# Patient Record
Sex: MALE | Race: WHITE
[De-identification: names, ages, dates, MRNs, and addresses within clinical notes are randomized per-mention and may not be internally consistent; named-entity substitution may affect disease eponyms.]

---

## 2017-06-12 ENCOUNTER — HOSPITAL ENCOUNTER (OUTPATIENT)
Dept: HOSPITAL 45 - C.LAB1850 | Age: 27
Discharge: HOME | End: 2017-06-12
Attending: NURSE PRACTITIONER
Payer: COMMERCIAL

## 2017-06-12 DIAGNOSIS — K50.90: Primary | ICD-10-CM

## 2017-06-12 LAB
ALBUMIN/GLOB SERPL: 1.1 {RATIO} (ref 0.9–2)
ALP SERPL-CCNC: 70 U/L (ref 45–117)
ALT SERPL-CCNC: 23 U/L (ref 12–78)
ANION GAP SERPL CALC-SCNC: 6 MMOL/L (ref 3–11)
AST SERPL-CCNC: 22 U/L (ref 15–37)
BASOPHILS # BLD: 0.03 K/UL (ref 0–0.2)
BASOPHILS NFR BLD: 0.6 %
BUN SERPL-MCNC: 19 MG/DL (ref 7–18)
BUN/CREAT SERPL: 19.1 (ref 10–20)
CALCIUM SERPL-MCNC: 9.3 MG/DL (ref 8.5–10.1)
CHLORIDE SERPL-SCNC: 105 MMOL/L (ref 98–107)
CO2 SERPL-SCNC: 29 MMOL/L (ref 21–32)
COMPLETE: YES
CREAT SERPL-MCNC: 1 MG/DL (ref 0.6–1.4)
CRP SERPL-MCNC: < 0.29 MG/DL (ref 0–0.29)
EOSINOPHIL NFR BLD AUTO: 262 K/UL (ref 130–400)
GLOBULIN SER-MCNC: 3.9 GM/DL (ref 2.5–4)
GLUCOSE SERPL-MCNC: 87 MG/DL (ref 70–99)
HCT VFR BLD CALC: 41.4 % (ref 42–52)
IG%: 0.2 %
IMM GRANULOCYTES NFR BLD AUTO: 22 %
LYMPHOCYTES # BLD: 1.12 K/UL (ref 1.2–3.4)
MCH RBC QN AUTO: 28.2 PG (ref 25–34)
MCHC RBC AUTO-ENTMCNC: 33.3 G/DL (ref 32–36)
MCV RBC AUTO: 84.7 FL (ref 80–100)
MONOCYTES NFR BLD: 8.8 %
NEUTROPHILS # BLD AUTO: 4.1 %
NEUTROPHILS NFR BLD AUTO: 64.3 %
PMV BLD AUTO: 10.2 FL (ref 7.4–10.4)
POTASSIUM SERPL-SCNC: 4.2 MMOL/L (ref 3.5–5.1)
RBC # BLD AUTO: 4.89 M/UL (ref 4.7–6.1)
SODIUM SERPL-SCNC: 140 MMOL/L (ref 136–145)
WBC # BLD AUTO: 5.09 K/UL (ref 4.8–10.8)

## 2017-06-14 LAB
M TB TUBERC IGNF/MITOGEN IGNF CONTROL: 0.09 IU/ML
QUANTIF TB AG-NIL: <0 IU/ML

## 2017-07-10 ENCOUNTER — HOSPITAL ENCOUNTER (OUTPATIENT)
Dept: HOSPITAL 45 - C.MAMM | Age: 27
Discharge: HOME | End: 2017-07-10
Attending: NURSE PRACTITIONER
Payer: COMMERCIAL

## 2017-07-10 DIAGNOSIS — M85.80: Primary | ICD-10-CM

## 2017-10-12 ENCOUNTER — HOSPITAL ENCOUNTER (OUTPATIENT)
Dept: HOSPITAL 45 - C.LABBC | Age: 27
Discharge: HOME | End: 2017-10-12
Attending: INTERNAL MEDICINE
Payer: COMMERCIAL

## 2017-10-12 DIAGNOSIS — K50.90: Primary | ICD-10-CM

## 2017-10-12 LAB
ALBUMIN/GLOB SERPL: 1.1 {RATIO} (ref 0.9–2)
ALP SERPL-CCNC: 73 U/L (ref 45–117)
ALT SERPL-CCNC: 24 U/L (ref 12–78)
ANION GAP SERPL CALC-SCNC: 7 MMOL/L (ref 3–11)
AST SERPL-CCNC: 21 U/L (ref 15–37)
BASOPHILS # BLD: 0.03 K/UL (ref 0–0.2)
BASOPHILS NFR BLD: 0.6 %
BUN SERPL-MCNC: 18 MG/DL (ref 7–18)
BUN/CREAT SERPL: 18.9 (ref 10–20)
CALCIUM SERPL-MCNC: 9.2 MG/DL (ref 8.5–10.1)
CHLORIDE SERPL-SCNC: 103 MMOL/L (ref 98–107)
CO2 SERPL-SCNC: 28 MMOL/L (ref 21–32)
COMPLETE: YES
CREAT SERPL-MCNC: 0.94 MG/DL (ref 0.6–1.4)
CRP SERPL-MCNC: < 0.29 MG/DL (ref 0–0.29)
EOSINOPHIL NFR BLD AUTO: 263 K/UL (ref 130–400)
GLOBULIN SER-MCNC: 4 GM/DL (ref 2.5–4)
GLUCOSE SERPL-MCNC: 85 MG/DL (ref 70–99)
HCT VFR BLD CALC: 43.1 % (ref 42–52)
IG%: 0.2 %
IMM GRANULOCYTES NFR BLD AUTO: 20.3 %
LYMPHOCYTES # BLD: 1.02 K/UL (ref 1.2–3.4)
MCH RBC QN AUTO: 27.8 PG (ref 25–34)
MCHC RBC AUTO-ENTMCNC: 32.5 G/DL (ref 32–36)
MCV RBC AUTO: 85.7 FL (ref 80–100)
MONOCYTES NFR BLD: 8 %
NEUTROPHILS # BLD AUTO: 4 %
NEUTROPHILS NFR BLD AUTO: 66.9 %
PMV BLD AUTO: 10.7 FL (ref 7.4–10.4)
POTASSIUM SERPL-SCNC: 4 MMOL/L (ref 3.5–5.1)
RBC # BLD AUTO: 5.03 M/UL (ref 4.7–6.1)
SODIUM SERPL-SCNC: 138 MMOL/L (ref 136–145)
WBC # BLD AUTO: 5.02 K/UL (ref 4.8–10.8)

## 2018-02-15 ENCOUNTER — HOSPITAL ENCOUNTER (OUTPATIENT)
Dept: HOSPITAL 45 - C.LABBC | Age: 28
Discharge: HOME | End: 2018-02-15
Attending: INTERNAL MEDICINE
Payer: COMMERCIAL

## 2018-02-15 DIAGNOSIS — K50.90: Primary | ICD-10-CM

## 2018-02-15 LAB
ALBUMIN SERPL-MCNC: 4.2 GM/DL (ref 3.4–5)
ALP SERPL-CCNC: 60 U/L (ref 45–117)
ALT SERPL-CCNC: 26 U/L (ref 12–78)
AST SERPL-CCNC: 19 U/L (ref 15–37)
BASOPHILS # BLD: 0.04 K/UL (ref 0–0.2)
BASOPHILS NFR BLD: 1 %
BUN SERPL-MCNC: 14 MG/DL (ref 7–18)
CALCIUM SERPL-MCNC: 9.3 MG/DL (ref 8.5–10.1)
CO2 SERPL-SCNC: 29 MMOL/L (ref 21–32)
CREAT SERPL-MCNC: 0.96 MG/DL (ref 0.6–1.4)
EOS ABS #: 0.13 K/UL (ref 0–0.5)
EOSINOPHIL NFR BLD AUTO: 258 K/UL (ref 130–400)
GLUCOSE SERPL-MCNC: 90 MG/DL (ref 70–99)
HCT VFR BLD CALC: 42.5 % (ref 42–52)
HGB BLD-MCNC: 14.5 G/DL (ref 14–18)
IG#: 0 K/UL (ref 0–0.02)
IMM GRANULOCYTES NFR BLD AUTO: 26 %
LYMPHOCYTES # BLD: 1 K/UL (ref 1.2–3.4)
MCH RBC QN AUTO: 29.5 PG (ref 25–34)
MCHC RBC AUTO-ENTMCNC: 34.1 G/DL (ref 32–36)
MCV RBC AUTO: 86.4 FL (ref 80–100)
MONO ABS #: 0.33 K/UL (ref 0.11–0.59)
MONOCYTES NFR BLD: 8.6 %
NEUT ABS #: 2.35 K/UL (ref 1.4–6.5)
NEUTROPHILS # BLD AUTO: 3.4 %
NEUTROPHILS NFR BLD AUTO: 61 %
PMV BLD AUTO: 10.5 FL (ref 7.4–10.4)
POTASSIUM SERPL-SCNC: 4.2 MMOL/L (ref 3.5–5.1)
PROT SERPL-MCNC: 8.2 GM/DL (ref 6.4–8.2)
RED CELL DISTRIBUTION WIDTH CV: 13.3 % (ref 11.5–14.5)
RED CELL DISTRIBUTION WIDTH SD: 42.1 FL (ref 36.4–46.3)
SODIUM SERPL-SCNC: 138 MMOL/L (ref 136–145)
WBC # BLD AUTO: 3.85 K/UL (ref 4.8–10.8)

## 2018-05-07 ENCOUNTER — HOSPITAL ENCOUNTER (OUTPATIENT)
Dept: HOSPITAL 45 - C.GI | Age: 28
Discharge: HOME | End: 2018-05-07
Attending: INTERNAL MEDICINE
Payer: COMMERCIAL

## 2018-05-07 VITALS — HEART RATE: 75 BPM | OXYGEN SATURATION: 98 % | DIASTOLIC BLOOD PRESSURE: 75 MMHG | SYSTOLIC BLOOD PRESSURE: 119 MMHG

## 2018-05-07 VITALS
WEIGHT: 142.31 LBS | HEIGHT: 69.02 IN | WEIGHT: 142.31 LBS | HEIGHT: 69.02 IN | BODY MASS INDEX: 21.08 KG/M2 | BODY MASS INDEX: 21.08 KG/M2

## 2018-05-07 DIAGNOSIS — F41.9: ICD-10-CM

## 2018-05-07 DIAGNOSIS — K50.90: Primary | ICD-10-CM

## 2018-05-07 DIAGNOSIS — K21.9: ICD-10-CM

## 2018-05-07 NOTE — DISCHARGE INSTRUCTIONS
Endoscopy Patient Instructions


Date / Procedure(s) Performed


May 7, 2018.


Colonoscopy





Allergy Information


Coded Allergies:  


     No Known Allergies (Verified , 5/7/18)





Discharge Date / Findings


May 7, 2018.


Normal colonoscopy to ileocolonic anastomosis





Medication Instructions


Stopped Medication(s):  


no supplements since Saturday


OK to resume all medications today as prescribed





Reported Home Medications








 Medications  Dose


 Route/Sig


 Max Daily Dose Days Date Category


 


 Folic Acid 400


 Mcg Tab  1 Tab


 PO DAILY


    5/2/18 Reported


 


 Vitamin B-12


  (Cyanocobalamin)


 500 Mcg Tab  500 Mcg


 PO DAILY


    5/2/18 Reported


 


 Calcium + D3


 600-200 mg-Unit


  (Calcium


 Carbonate-Vitamin


 D) 1 Tab Tab  1 Tab


 PO DAILY


    5/2/18 Reported


 


 Hydrocortisone


  (Hydrocortisone


  (Topical)) 2.5 %


 Oin  


 TOP BID


   10 5/2/18 Reported


 


 Questran


  (Cholestyramine)


 4 Gm Pow  


 PO DAILY


    12/15/17 Reported


 


 [Entyvio]    1 Dose


 IV Q2MOS


    6/27/16 Reported











Provider Instructions





Activity Restrictions





-  No exercising or heavy lifting for 24 hours. 


-  Do not drink alcohol the day of the procedure.


-  Do not drive a car or operate machinery until the day after the procedure.


-  Do not make any important decisions or sign important papers in 24 hours 

after the procedure.





Following Day:





-  Return to full activity which may include returning to work/school.





Diet





Start your diet with liquids and light foods (jello, soup, juice, toast).  Then 

eat your usual diet if not nauseated.





Treatment For Common After Affects





For mild abdominal pain, bloating, or excessive gas:





-  Rest


-  Eat lightly


-  Lie on right side





Follow-Up Information


Follow-up with Dr. Seamus Benavides as scheduled





Anesthesia Information





What You Should Know





You have had a procedure that required some medicine to reduce anxiety and 

discomfort. This treatment is called moderate sedation.  


After receiving the treatment, you may be sleepy, but you will be able to 

breathe on your own.  The effects of the treatment may last for several hours.








Follow these instructions along with Activity/Diet recommendations noted above:





*  Do NOT do anything where dizziness or clumsiness would be dangerous.





*  Rest quietly at home today, then you can be up and about tomorrow.





*  Have a responsible person stay with you the rest of today.





*  You may have had an I.V. today.  If so, you may take the dressing off later 

today.





Recommendations


 


Call your doctor if:





*  Trouble breathing 





*  Continuous vomiting for more than 24 hours








*  Temperature above 101 degrees





*  Severe abdominal pain or bloating





*  Pain not relieved by pain medicine ordered





*  There is increased drainage or redness from any incision





*  A large amount of rectal bleeding greater than 2-3 tablespoons. 


   (If you had a polyp/s removed or have hemorrhoids, a small amount of blood -


    from the rectum is to be expected.)





*  You have any unanswered questions or concerns.








IN THE EVENT OF A SERIOUS EMERGENCY, GO TO THE NEAREST EMERGENCY ROOM








       Your discharge instructions were prepared by provider Gaurav Gandhi.





 Patient Instructions Signature Page














Sheldon Milner 











Patient (or Guardian) Signature/Date:____________________________________ I 

have read and understand the instructions given to me by my caregivers.








Caregiver/RN/Doctor Signature/Date:____________________________________











The above-named patient and/or guardian has received patient instructions on 

this date.





























+  Original Patient Signature Page (only) stays with chart.  Please make copy 

for patient.

## 2018-05-07 NOTE — ENDO HISTORY AND PHYSICAL
History & Physical


Date of Service:


May 7, 2018.


Chief Complaint:


Crohn's


Referring Physician:


Dr. Seamus Benavides


History of Present Illness


28 yo CM who presents for colonoscopy secondary to Crohn's ileitis.





Past Medical History


Gastrointestinal Disorder, Anxiety





Past Surgical History


Hx Cardiac Surgery:  No


Hx Internal Defibrillator:  No


Hx Pacemaker:  No


Hx Abdominal Surgery:  Yes (COLON RESECTION AND APPY-02/2006)


Hx of Implantable Prosthesis:  No


Hx Post-Op Nausea and Vomiting:  No


Hx Cancer Surgery:  No


Hx Thoracic Surgery:  No


Hx Orthopedic:  No


Hx Urinary Tract Surgery:  No





Family History


IBD





Social History


Smoking Status:  Never Smoker


Hx Substance Use:  No


Hx Alcohol Use:  Yes (ONCE OR TWICE A WEEK, A GLASS OF BEER OR WINE)





Allergies


Coded Allergies:  


     No Known Allergies (Verified , 5/7/18)





Current Medications





Reported Home Medications








 Medications  Dose


 Route/Sig


 Max Daily Dose Days Date Category


 


 Folic Acid 400


 Mcg Tab  1 Tab


 PO DAILY


    5/2/18 Reported


 


 Vitamin B-12


  (Cyanocobalamin)


 500 Mcg Tab  500 Mcg


 PO DAILY


    5/2/18 Reported


 


 Calcium + D3


 600-200 mg-Unit


  (Calcium


 Carbonate-Vitamin


 D) 1 Tab Tab  1 Tab


 PO DAILY


    5/2/18 Reported


 


 Hydrocortisone


  (Hydrocortisone


  (Topical)) 2.5 %


 Oin  


 TOP BID


   10 5/2/18 Reported


 


 Questran


  (Cholestyramine)


 4 Gm Pow  


 PO DAILY


    12/15/17 Reported


 


 [Entyvio]    1 Dose


 IV Q2MOS


    6/27/16 Reported











Vital Signs


Weight (Kilograms):  64.55


Height (Feet):  5


Height (Inches):  9











  Date Time  Temp Pulse Resp B/P (MAP) Pulse Ox O2 Delivery O2 Flow Rate FiO2


 


5/7/18 12:49 36.7 79 18 119/72 (88) 99 Room Air  











Physical Exam


General Appearance:  WD/WN, no apparent distress


Respiratory/Chest:  


   Auscultation:  breath sounds normal


Cardiovascular:  


   Heart Auscultation:  RRR


Abdomen:  


   Bowel Sounds:  normal


   Inspection & Palpation:  soft, non-distended, no tenderness, guarding & 

rebound





Assessment and Plan


Assessment:


28 yo CM who presents for colonoscopy secondary to Crohn's ileitis.








Plan:


Proceed with colonoscopy.

## 2018-05-07 NOTE — GI REPORT
Patient Name: Sheldon Milner

Procedure Date: 5/7/2018 1:20 PM

MRN: [id number]

Account Number: [id number]

YOB: 1990

Admit Type: Outpatient

Age: 27

Gender: Male

Attending MD: Gaurav Gandhi DO

Procedure:            Colonoscopy

Providers:            Gaurav Gandhi DO

Referring MD:         Gaurav Gandhi DO

Indications:          Disease activity assessment of Crohn's disease of the 

                      small bowel

Medicines:            Fentanyl 100 micrograms IV, Midazolam 6 mg IV

Complications:        No immediate complications.

Estimated Blood Loss: Estimated blood loss: none.

Procedure:            Pre-Anesthesia Assessment:

                      - Prior to the procedure, a History and Physical was 

                      performed, and patient medications and allergies were 

                      reviewed. The patient's tolerance of previous 

                      anesthesia was also reviewed. The risks and benefits of 

                      the procedure and the sedation options and risks were 

                      discussed with the patient. All questions were 

                      answered, and informed consent was obtained. Prior 

                      Anticoagulants: The patient has taken no previous 

                      anticoagulant or antiplatelet agents. ASA Grade 

                      Assessment: II - A patient with mild systemic disease. 

                      After reviewing the risks and benefits, the patient was 

                      deemed in satisfactory condition to undergo the 

                      procedure.

                      After I obtained informed consent, the scope was passed 

                      under direct vision. Throughout the procedure, the 

                      patient's blood pressure, pulse, and oxygen saturations 

                      were monitored continuously. The scope was introduced 

                      through the anus and advanced to the ileocolonic 

                      anastomosis. The colonoscopy was performed without 

                      difficulty. The patient tolerated the procedure well. 

                      The quality of the bowel preparation was good. The 

                      terminal ileum and the rectum were photographed.

Findings:

     The perianal and digital rectal examinations were normal.

     There was evidence of a prior end-to-side ileo-colonic anastomosis in 

     the ascending colon. This was patent and was characterized by healthy 

     appearing mucosa.

Impression:           - Patent end-to-side ileo-colonic anastomosis, 

                      characterized by healthy appearing mucosa.

                      - No specimens collected.

Recommendation:       - Resume previous diet.

                      - Continue present medications.

                      - Repeat colonoscopy in 2 years for surveillance.

                      - Return to primary care physician as previously 

                      scheduled.

Gaurav Gandhi DO

5/7/2018 1:48:30 PM

This report has been signed electronically.

Note Initiated On: 5/7/2018 1:20 PM

Number of Addenda: 0

     I attest to the content of the Intraoperative Record and orders 

     documented therein, exceptions below



{[id number]}